# Patient Record
Sex: MALE | Race: WHITE | URBAN - METROPOLITAN AREA
[De-identification: names, ages, dates, MRNs, and addresses within clinical notes are randomized per-mention and may not be internally consistent; named-entity substitution may affect disease eponyms.]

---

## 2018-02-02 ENCOUNTER — OFFICE VISIT (OUTPATIENT)
Dept: PEDIATRICS | Facility: CLINIC | Age: 28
End: 2018-02-02
Payer: COMMERCIAL

## 2018-02-02 VITALS
HEART RATE: 97 BPM | HEIGHT: 73 IN | DIASTOLIC BLOOD PRESSURE: 60 MMHG | OXYGEN SATURATION: 100 % | TEMPERATURE: 97 F | BODY MASS INDEX: 19.3 KG/M2 | WEIGHT: 145.6 LBS | SYSTOLIC BLOOD PRESSURE: 150 MMHG

## 2018-02-02 DIAGNOSIS — Z20.2 EXPOSURE TO CHLAMYDIA: Primary | ICD-10-CM

## 2018-02-02 DIAGNOSIS — Z20.2 POTENTIAL EXPOSURE TO STD: ICD-10-CM

## 2018-02-02 PROCEDURE — 99213 OFFICE O/P EST LOW 20 MIN: CPT | Performed by: NURSE PRACTITIONER

## 2018-02-02 PROCEDURE — 87491 CHLMYD TRACH DNA AMP PROBE: CPT | Performed by: NURSE PRACTITIONER

## 2018-02-02 PROCEDURE — 87591 N.GONORRHOEAE DNA AMP PROB: CPT | Performed by: NURSE PRACTITIONER

## 2018-02-02 RX ORDER — DOXYCYCLINE 100 MG/1
100 CAPSULE ORAL 2 TIMES DAILY
Qty: 14 CAPSULE | Refills: 0 | Status: SHIPPED | OUTPATIENT
Start: 2018-02-02 | End: 2018-02-02 | Stop reason: ALTCHOICE

## 2018-02-02 RX ORDER — ISOTRETINOIN 40 MG/1
40 CAPSULE ORAL 2 TIMES DAILY
COMMUNITY

## 2018-02-02 RX ORDER — AZITHROMYCIN 500 MG/1
1000 TABLET, FILM COATED ORAL ONCE
Qty: 2 TABLET | Refills: 0 | Status: SHIPPED | OUTPATIENT
Start: 2018-02-02 | End: 2018-02-02

## 2018-02-02 NOTE — NURSING NOTE
"Chief Complaint   Patient presents with     STD     possible exposure to chlamydia x 3 weeks       Initial /60 (BP Location: Right arm, Patient Position: Sitting, Cuff Size: Adult Regular)  Pulse 97  Temp 97  F (36.1  C) (Temporal)  Ht 6' 0.5\" (1.842 m)  Wt 145 lb 9.6 oz (66 kg)  SpO2 100%  BMI 19.48 kg/m2 Estimated body mass index is 19.48 kg/(m^2) as calculated from the following:    Height as of this encounter: 6' 0.5\" (1.842 m).    Weight as of this encounter: 145 lb 9.6 oz (66 kg).  Medication Reconciliation: complete      MINOR Tucker      "

## 2018-02-02 NOTE — MR AVS SNAPSHOT
After Visit Summary   2/2/2018    Dimas Tinsley    MRN: [id number]           Patient Information     Date Of Birth          1990        Visit Information        Provider Department      2/2/2018 9:50 AM Heide Hamilton APRN [facility]        Today's Diagnoses     Exposure to chlamydia    -  1    Potential exposure to STD          Care Instructions    PLAN:   1.   Symptomatic therapy suggested: will treat with zithromax.  2.  Orders Placed This Encounter   Medications     ISOtretinoin (MYORISAN) 40 MG capsule     Sig: Take 40 mg by mouth 2 times daily     DISCONTD: doxycycline (VIBRAMYCIN) 100 MG capsule     Sig: Take 1 capsule (100 mg) by mouth 2 times daily     Dispense:  14 capsule     Refill:  0     azithromycin (ZITHROMAX) 500 MG tablet     Sig: Take 2 tablets (1,000 mg) by mouth once for 1 dose     Dispense:  2 tablet     Refill:  0       3. Patient needs to follow up in if no improvement,or sooner if worsening of symptoms or other symptoms develop.  Will follow up and/or notify patient of  results via My Chart to determine further need for followup      It was a pleasure seeing you today at the [facility]. Thank you for allowing us to care for you today. We truly hope we provided you with the excellent service you deserve. Please let us know if there is anything else we can do for you so we can be sure you are leaving completley satisfied with your care experience.       General information about your clinic   Clinic Hours Lab Hours (Appointments are required)   Mon-Thurs: 7:30 AM - 7 PM Mon-Thurs: 7:30 AM - 7 PM   Fri: 7:30 AM - 5 PM Fri: 7:30 AM - 5 PM        After Hours Nurse Advise & Appts:  [facility]: [phone]  [facility] On Call: to make appointments anytime: [phone] On Call Physician: call [phone] and answering service will page the on call physician.        For urgent appointments, please call  [phone] and ask for the triage nurse or your care team clinic nurse.  How to contact my care team:  [facility]: [email or web address]   Phone: [phone]   Fax: [phone]       [facility]:   Phone: [phone]  Hours: 8:00 AM - 6:00 PM  Medication Refills:  Call your pharmacy and they will forward the refill to us. Please allow 3 business days for your refills to be completed.       Normal or non-critical lab and imaging results will be communicated to you by MyChart, letter or phone within 7 days.  If you do not hear from us within 10 days, please call the clinic. If you have a critical or abnormal lab result, we will notify you by phone as soon as possible.       We now have PWIC (Pediatric Walk in Care)  Monday-Friday from 7:30-4. Simply walk in and be seen for your urgent needs like cough, fever, rash, diarrhea or vomiting, pink eye, UTI. No appointments needed. Ask one of the team for more information      -Your Care Team:    Dr. Caro Redd - Internal Medicine/Pediatrics   Dr. Meghana Trejo - Family Medicine  Dr. Yuli Christina - Pediatrics  Dr. Celeste Ferrari - Pediatrics  Heide Hamilton CNP - Family Practice Nurse Practitioner                           Follow-ups after your visit        Who to contact     If you have questions or need follow up information about today's clinic visit or your schedule please contact [facility] directly at [phone].  Normal or non-critical lab and imaging results will be communicated to you by Cool Lumenshart, letter or phone within 4 business days after the clinic has received the results. If you do not hear from us within 7 days, please contact the clinic through Cool Lumenshart or phone. If you have a critical or abnormal lab result, we will notify you by phone as soon as possible.  Submit refill requests through RVX or call your pharmacy and they will forward the refill request to us. Please allow 3 business days for your refill to be completed.     "      Additional Information About Your Visit        Notorioushart Information     [employer] gives you secure access to your electronic health record. If you see a primary care provider, you can also send messages to your care team and make appointments. If you have questions, please call your primary care clinic.  If you do not have a primary care provider, please call [phone] and they will assist you.      [employer] is an electronic gateway that provides easy, online access to your medical records. With [employer], you can request a clinic appointment, read your test results, renew a prescription or communicate with your care team.     To access your existing account, please contact your [facility] or call [phone] for assistance.        Care EveryWhere ID     This is your Care EveryWhere ID. This could be used by other organizations to access your [facility] medical records  [id number]        Your Vitals Were     Pulse Temperature Height Pulse Oximetry BMI (Body Mass Index)       97 97  F (36.1  C) (Temporal) 6' 0.5\" (1.842 m) 100% 19.48 kg/m2        Blood Pressure from Last 3 Encounters:   02/02/18 150/60   12/02/14 139/87   11/12/14 130/88    Weight from Last 3 Encounters:   02/02/18 145 lb 9.6 oz (66 kg)   12/02/14 155 lb 1.6 oz (70.4 kg)   11/12/14 154 lb (69.9 kg)              We Performed the Following     Chlamydia trachomatis PCR     Neisseria gonorrhoeae PCR          Today's Medication Changes          These changes are accurate as of 2/2/18 10:34 AM.  If you have any questions, ask your nurse or doctor.               Start taking these medicines.        Dose/Directions    azithromycin 500 MG tablet   Commonly known as:  ZITHROMAX   Used for:  Exposure to chlamydia   Started by:  Heide Hamilton APRN CNP        Dose:  1000 mg   Take 2 tablets (1,000 mg) by mouth once for 1 dose   Quantity:  2 tablet   Refills:  0            Where to get your medicines      These " medications were sent to [facility] [address] [facility] [address] [facility]  [address] [facility] [address]     Phone:  [phone]     azithromycin 500 MG tablet                Primary Care Provider Office Phone # Fax #    Vani Greenberg [phone]       [address] [place] [address]        Equal Access to Services     [facility]: Hadii aad ku hadasho Soomaali, waaxda luqadaha, qaybta kaalmada adeegyada, waxay idiin hayaan adeeg kharash la'aan ah. So [facility] [phone].    ATENCIÓN: Si habla español, tiene a schuler disposición servicios gratuitos de asistencia lingüística. [facility] [phone].    We comply with applicable federal civil rights laws and [place] laws. We do not discriminate on the basis of race, color, national origin, age, disability, sex, sexual orientation, or gender identity.            Thank you!     Thank you for choosing [facility]  for your care. Our goal is always to provide you with excellent care. Hearing back from our patients is one way we can continue to improve our services. Please take a few minutes to complete the written survey that you may receive in the mail after your visit with us. Thank you!             Your Updated Medication List - Protect others around you: Learn how to safely use, store and throw away your medicines at www.disposemymeds.org.          This list is accurate as of 2/2/18 10:34 AM.  Always use your most recent med list.                   Brand Name Dispense Instructions for use Diagnosis    azithromycin 500 MG tablet    ZITHROMAX    2 tablet    Take 2 tablets (1,000 mg) by mouth once for 1 dose    Exposure to chlamydia       MYORISAN 40 MG capsule   Generic drug:  ISOtretinoin      Take 40 mg by mouth 2 times daily

## 2018-02-02 NOTE — PATIENT INSTRUCTIONS
PLAN:   1.   Symptomatic therapy suggested: will treat with zithromax.  2.  Orders Placed This Encounter   Medications     ISOtretinoin (MYORISAN) 40 MG capsule     Sig: Take 40 mg by mouth 2 times daily     DISCONTD: doxycycline (VIBRAMYCIN) 100 MG capsule     Sig: Take 1 capsule (100 mg) by mouth 2 times daily     Dispense:  14 capsule     Refill:  0     azithromycin (ZITHROMAX) 500 MG tablet     Sig: Take 2 tablets (1,000 mg) by mouth once for 1 dose     Dispense:  2 tablet     Refill:  0       3. Patient needs to follow up in if no improvement,or sooner if worsening of symptoms or other symptoms develop.  Will follow up and/or notify patient of  results via My Chart to determine further need for followup      It was a pleasure seeing you today at the [facility]. Thank you for allowing us to care for you today. We truly hope we provided you with the excellent service you deserve. Please let us know if there is anything else we can do for you so we can be sure you are leaving completley satisfied with your care experience.       General information about your clinic   Clinic Hours Lab Hours (Appointments are required)   Mon-Thurs: 7:30 AM - 7 PM Mon-Thurs: 7:30 AM - 7 PM   Fri: 7:30 AM - 5 PM Fri: 7:30 AM - 5 PM        After Hours Nurse Advise & Appts:  [facility]: [phone]  [facility] On Call: to make appointments anytime: [phone] On Call Physician: call [phone] and answering service will page the on call physician.        For urgent appointments, please call [phone] and ask for the triage nurse or your care team clinic nurse.  How to contact my care team:  MyChart: [email or web address]   Phone: [phone]   Fax: [phone]       [facility]:   Phone: [phone]  Hours: 8:00 AM - 6:00 PM  Medication Refills:  Call your pharmacy and they will forward the refill to us. Please allow 3 business days for your refills to be completed.        Normal or non-critical lab and imaging results will be communicated to you by MyChart, letter or phone within 7 days.  If you do not hear from us within 10 days, please call the clinic. If you have a critical or abnormal lab result, we will notify you by phone as soon as possible.       We now have PWIC (Pediatric Walk in Care)  Monday-Friday from 7:30-4. Simply walk in and be seen for your urgent needs like cough, fever, rash, diarrhea or vomiting, pink eye, UTI. No appointments needed. Ask one of the team for more information      -Your Care Team:    Dr. Caro Redd - Internal Medicine/Pediatrics   Dr. Meghana Trejo - Family Medicine  Dr. Yuli Christina - Pediatrics  Dr. Celeste Ferrari - Pediatrics  Heide Hamilton CNP - Family Practice Nurse Practitioner

## 2018-02-02 NOTE — PROGRESS NOTES
SUBJECTIVE:   Dimas Tinsley is a 27 year old male who presents to clinic today for the following health issues:    Concern - possible exposure to chlamydia   Onset: 3 weeks    Description:   Patient notes urethral brown discharge, pain only when urinating x 3 weeks. Partner tested positive for chlamydia     Intensity: mild    Progression of Symptoms:  same    Accompanying Signs & Symptoms:  Redness around the tip of the penis    Previous history of similar problem:   none    Precipitating factors:   Worsened by: none    Alleviating factors:  Improved by: none    Therapies Tried and outcome: none      Problem list and histories reviewed & adjusted, as indicated.  Additional history: as documented    Patient Active Problem List   Diagnosis     Other atopic dermatitis and related conditions     Other specified disease of hair and hair follicles     Other acne     CARDIOVASCULAR SCREENING; LDL GOAL LESS THAN 160     GERD (gastroesophageal reflux disease)     Viral URI     Past Surgical History:   Procedure Laterality Date     EXCISE EXOSTOSIS TOE(S)  4/24/2012    Procedure:EXCISE EXOSTOSIS TOE(S); Excision Bone Spur Right Great Toe; Surgeon:JONI ARMSTRONG; Location:[facility]       Social History   Substance Use Topics     Smoking status: Never Smoker     Smokeless tobacco: Never Used     Alcohol use Yes     Family History   Problem Relation Age of Onset     Thyroid Disease Mother      hypothyroidism     DIABETES Paternal Grandmother      Thyroid Disease Maternal Grandmother      Circulatory Paternal Grandfather      DVT     Asthma No family hx of      C.A.D. No family hx of      Hypertension No family hx of      CEREBROVASCULAR DISEASE No family hx of      Breast Cancer No family hx of      Cancer - colorectal No family hx of      Prostate Cancer No family hx of      Alzheimer Disease No family hx of      Arthritis No family hx of      Blood Disease No family hx of      CANCER No family hx of       "Cardiovascular No family hx of      HEART DISEASE No family hx of      Eye Disorder No family hx of      GASTROINTESTINAL DISEASE No family hx of      Genitourinary Problems No family hx of      Lipids No family hx of      Musculoskeletal Disorder No family hx of      Neurologic Disorder No family hx of      Respiratory No family hx of          Current Outpatient Prescriptions   Medication Sig Dispense Refill     ISOtretinoin (MYORISAN) 40 MG capsule Take 40 mg by mouth 2 times daily       Allergies   Allergen Reactions     No Known Drug Allergies      Labs reviewed in EPIC    Reviewed and updated as needed this visit by clinical staff  Tobacco  Allergies  Meds  Med Hx  Surg Hx  Fam Hx  Soc Hx      Reviewed and updated as needed this visit by Provider         ROS:  CONSTITUTIONAL:NEGATIVE for fever, chills, change in weight  ENT/MOUTH: NEGATIVE for ear, mouth and throat problems  RESP:NEGATIVE for significant cough or SOB  CV: NEGATIVE for chest pain, palpitations or peripheral edema  : negative for dysuria, hematuria, decreased urinary stream, erectile dysfunction  MUSCULOSKELETAL: NEGATIVE for significant arthralgias or myalgia    OBJECTIVE:     /60 (BP Location: Right arm, Patient Position: Sitting, Cuff Size: Adult Regular)  Pulse 97  Temp 97  F (36.1  C) (Temporal)  Ht 6' 0.5\" (1.842 m)  Wt 145 lb 9.6 oz (66 kg)  SpO2 100%  BMI 19.48 kg/m2  Body mass index is 19.48 kg/(m^2).  GENERAL: healthy, alert and no distress  NECK: no adenopathy, no asymmetry, masses, or scars and thyroid normal to palpation  RESP: lungs clear to auscultation - no rales, rhonchi or wheezes  CV: regular rates and rhythm, peripheral pulses strong and no peripheral edema  ABDOMEN: soft, nontender, no hepatosplenomegaly, no masses and bowel sounds normal  MS: no gross musculoskeletal defects noted, no edema  SKIN: no suspicious lesions or rashes  NEURO: Normal strength and tone, mentation intact and speech normal  PSYCH: " mentation appears normal, affect normal/bright    Diagnostic Test Results:  Results for orders placed or performed in visit on 02/02/18   Neisseria gonorrhoeae PCR   Result Value Ref Range    Specimen Descrip Urine     N Gonorrhea PCR Negative NEG^Negative   Chlamydia trachomatis PCR   Result Value Ref Range    Specimen Description Urine     Chlamydia Trachomatis PCR Negative NEG^Negative         ASSESSMENT/PLAN:     Dimas was seen today for std.    Diagnoses and all orders for this visit:    Exposure to chlamydia  -     azithromycin (ZITHROMAX) 500 MG tablet; Take 2 tablets (1,000 mg) by mouth once for 1 dose  -     Neisseria gonorrhoeae PCR  -     Chlamydia trachomatis PCR    Potential exposure to STD  -   -     azithromycin (ZITHROMAX) 500 MG tablet; Take 2 tablets (1,000 mg) by mouth once for 1 dose  -     Neisseria gonorrhoeae PCR  -     Chlamydia trachomatis PCR      PLAN:   Patient needs to follow up in if no improvement,or sooner if worsening of symptoms or other symptoms develop.  Will follow up and/or notify patient of  results via My Chart to determine further need for followup      See Patient Instructions    CEE East CNP  [facility]

## 2018-02-04 LAB
C TRACH DNA SPEC QL NAA+PROBE: NEGATIVE
N GONORRHOEA DNA SPEC QL NAA+PROBE: NEGATIVE
SPECIMEN SOURCE: NORMAL
SPECIMEN SOURCE: NORMAL

## 2018-02-04 NOTE — PROGRESS NOTES
Andrew Tinsley,    Attached are your test results.  -Chlamydia and gonnohrea tests are normal.   Please contact us if you have any questions.    Heide Hamilton, CNP
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
## 2018-07-31 ENCOUNTER — OFFICE VISIT (OUTPATIENT)
Dept: FAMILY MEDICINE | Facility: OTHER | Age: 28
End: 2018-07-31
Payer: COMMERCIAL

## 2018-07-31 VITALS
HEIGHT: 73 IN | OXYGEN SATURATION: 100 % | RESPIRATION RATE: 16 BRPM | WEIGHT: 160 LBS | BODY MASS INDEX: 21.2 KG/M2 | TEMPERATURE: 97.6 F | DIASTOLIC BLOOD PRESSURE: 72 MMHG | HEART RATE: 77 BPM | SYSTOLIC BLOOD PRESSURE: 112 MMHG
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
## 2018-07-31 PROCEDURE — 99213 OFFICE O/P EST LOW 20 MIN: CPT | Performed by: FAMILY MEDICINE

## 2018-07-31 ASSESSMENT — PAIN SCALES - GENERAL: PAINLEVEL: NO PAIN (0)

## 2018-07-31 NOTE — MR AVS SNAPSHOT
"              After Visit Summary   7/31/2018    Dimas Tinsley    MRN: [id number]           Patient Information     Date Of Birth          1990        Visit Information        Provider Department      7/31/2018 4:00 PM Alexandra Koch MD [facility]         Follow-ups after your visit        Who to contact     If you have questions or need follow up information about today's clinic visit or your schedule please contact [facility] directly at [phone].  Normal or non-critical lab and imaging results will be communicated to you by MyChart, letter or phone within 4 business days after the clinic has received the results. If you do not hear from us within 7 days, please contact the clinic through [facility] or phone. If you have a critical or abnormal lab result, we will notify you by phone as soon as possible.  Submit refill requests through [employer] or call your pharmacy and they will forward the refill request to us. Please allow 3 business days for your refill to be completed.          Additional Information About Your Visit        MyChart Information     [employer] gives you secure access to your electronic health record. If you see a primary care provider, you can also send messages to your care team and make appointments. If you have questions, please call your primary care clinic.  If you do not have a primary care provider, please call [phone] and they will assist you.        Care EveryWhere ID     This is your Care EveryWhere ID. This could be used by other organizations to access your [facility] medical records  [id number]        Your Vitals Were     Pulse Temperature Respirations Height Pulse Oximetry BMI (Body Mass Index)    77 97.6  F (36.4  C) (Temporal) 16 6' 0.5\" (1.842 m) 100% 21.4 kg/m2       Blood Pressure from Last 3 Encounters:   07/31/18 112/72   02/02/18 150/60   12/02/14 139/87    Weight from Last 3 Encounters:   07/31/18 160 lb (72.6 kg)   02/02/18 145 " lb 9.6 oz (66 kg)   12/02/14 155 lb 1.6 oz (70.4 kg)              Today, you had the following     No orders found for display       Primary Care Provider Office Phone # Fax #    Vani Bre Greenberg [phone]       [address] [place] [id number]        Equal Access to Services     [facility] : Hadii aad ku hadasho Soomaali, waaxda luqadaha, qaybta kaalmada adeegyada, waxay idiin hayaan adeeg renoarafrannie laanabel . So [facility] [phone].    ATENCIÓN: Si habla español, tiene a schuler disposición servicios gratuitos de asistencia lingüística. Llame al [phone].    We comply with applicable federal civil rights laws and [place] laws. We do not discriminate on the basis of race, color, national origin, age, disability, sex, sexual orientation, or gender identity.            Thank you!     Thank you for choosing [facility]  for your care. Our goal is always to provide you with excellent care. Hearing back from our patients is one way we can continue to improve our services. Please take a few minutes to complete the written survey that you may receive in the mail after your visit with us. Thank you!             Your Updated Medication List - Protect others around you: Learn how to safely use, store and throw away your medicines at www.disposemymeds.org.          This list is accurate as of 7/31/18  4:46 PM.  Always use your most recent med list.                   Brand Name Dispense Instructions for use Diagnosis    MYORISAN 40 MG capsule   Generic drug:  ISOtretinoin      Take 40 mg by mouth 2 times daily

## 2019-11-04 ENCOUNTER — HEALTH MAINTENANCE LETTER (OUTPATIENT)
Age: 29
End: 2019-11-04

## 2020-11-16 ENCOUNTER — HEALTH MAINTENANCE LETTER (OUTPATIENT)
Age: 30
End: 2020-11-16

## 2021-09-18 ENCOUNTER — HEALTH MAINTENANCE LETTER (OUTPATIENT)
Age: 31
End: 2021-09-18

## 2022-01-08 ENCOUNTER — HEALTH MAINTENANCE LETTER (OUTPATIENT)
Age: 32
End: 2022-01-08

## 2022-11-20 ENCOUNTER — HEALTH MAINTENANCE LETTER (OUTPATIENT)
Age: 32
End: 2022-11-20

## 2023-04-15 ENCOUNTER — HEALTH MAINTENANCE LETTER (OUTPATIENT)
Age: 33
End: 2023-04-15